# Patient Record
Sex: MALE | Race: OTHER | URBAN - NONMETROPOLITAN AREA
[De-identification: names, ages, dates, MRNs, and addresses within clinical notes are randomized per-mention and may not be internally consistent; named-entity substitution may affect disease eponyms.]

---

## 2022-01-13 ENCOUNTER — APPOINTMENT (RX ONLY)
Dept: URBAN - NONMETROPOLITAN AREA CLINIC 22 | Facility: CLINIC | Age: 62
Setting detail: DERMATOLOGY
End: 2022-01-13

## 2022-01-13 DIAGNOSIS — D485 NEOPLASM OF UNCERTAIN BEHAVIOR OF SKIN: ICD-10-CM

## 2022-01-13 PROBLEM — D48.5 NEOPLASM OF UNCERTAIN BEHAVIOR OF SKIN: Status: ACTIVE | Noted: 2022-01-13

## 2022-01-13 PROCEDURE — 11102 TANGNTL BX SKIN SINGLE LES: CPT

## 2022-01-13 PROCEDURE — ? BIOPSY BY SHAVE METHOD

## 2022-01-13 PROCEDURE — ? ADDITIONAL NOTES

## 2022-01-13 ASSESSMENT — LOCATION ZONE DERM: LOCATION ZONE: EYELID

## 2022-01-13 ASSESSMENT — LOCATION SIMPLE DESCRIPTION DERM: LOCATION SIMPLE: LEFT EYELID

## 2022-01-13 ASSESSMENT — LOCATION DETAILED DESCRIPTION DERM: LOCATION DETAILED: LEFT MEDIAL CANTHUS

## 2022-01-13 NOTE — PROCEDURE: MIPS QUALITY
Quality 130: Documentation Of Current Medications In The Medical Record: Current Medications Documented
Additional Notes: The E/M service provided during this visit was medically necessary, significant, and independent from the procedure(s) provided on the same date of service and included documented elements above and beyond the usual pre-, intra-, and post-operative work associated with the procedure(s).
Quality 431: Preventive Care And Screening: Unhealthy Alcohol Use - Screening: Patient screened for unhealthy alcohol use using a single question and scores less than 2 times per year
Quality 226: Preventive Care And Screening: Tobacco Use: Screening And Cessation Intervention: Patient screened for tobacco use and is an ex/non-smoker
Detail Level: Detailed

## 2022-04-05 ENCOUNTER — APPOINTMENT (RX ONLY)
Dept: URBAN - NONMETROPOLITAN AREA CLINIC 22 | Facility: CLINIC | Age: 62
Setting detail: DERMATOLOGY
End: 2022-04-05

## 2022-04-05 PROBLEM — C44.1192 BASAL CELL CARCINOMA OF SKIN OF LEFT LOWER EYELID, INCLUDING CANTHUS: Status: ACTIVE | Noted: 2022-04-05

## 2022-04-05 PROCEDURE — ? MOHS SURGERY

## 2022-04-05 PROCEDURE — 17312 MOHS ADDL STAGE: CPT

## 2022-04-05 PROCEDURE — 17311 MOHS 1 STAGE H/N/HF/G: CPT

## 2022-04-05 NOTE — PROCEDURE: MOHS SURGERY
-- DO NOT REPLY / DO NOT REPLY ALL --  -- Message is from the [facility]--    General Patient Message      Reason for Call: PATIENT IS CALLING TO ASK THE DOCTOR TO SEND IN A ZPACK AND COUGH SYRUP TO THE [facility] ON FILE TO HELP HER SYMPTOMS OF COUGH, CONGESTION, RUNNY NOSE, AND YELLOW PHLEGM. PLEASE CALL BACK ASAP TO CONFIRM WHETHER OR NOT IT IS BEING SENT IT.    Caller Information       Type Contact Phone    12/27/2021 01:02 PM CST Phone (Incoming) Michelle An (Self) [phone] (M)          Alternative phone number: NONE        Did the caller agree that this message can wait until the office reopens on Monday (or after the holiday)? YES - The Message Can Wait      Send a message to the provider's clinical support pool.     Turnaround time given to caller:   \"This message will be sent to [state Provider's name]. The clinical team will fulfill your request as soon as they review your message when the office opens on Monday (or after the holiday).\"       Medical Necessity Statement: Based on my medical judgement, Mohs surgery is the most appropriate treatment for this cancer compared to other treatments.

## 2022-07-07 ENCOUNTER — APPOINTMENT (RX ONLY)
Dept: URBAN - NONMETROPOLITAN AREA CLINIC 19 | Facility: CLINIC | Age: 62
Setting detail: DERMATOLOGY
End: 2022-07-07

## 2022-07-07 DIAGNOSIS — L50.1 IDIOPATHIC URTICARIA: ICD-10-CM | Status: IMPROVED

## 2022-07-07 PROCEDURE — ? COUNSELING

## 2022-07-07 PROCEDURE — 99213 OFFICE O/P EST LOW 20 MIN: CPT

## 2022-07-07 ASSESSMENT — LOCATION SIMPLE DESCRIPTION DERM: LOCATION SIMPLE: RIGHT UPPER BACK

## 2022-07-07 ASSESSMENT — LOCATION DETAILED DESCRIPTION DERM: LOCATION DETAILED: RIGHT SUPERIOR UPPER BACK

## 2022-07-07 ASSESSMENT — LOCATION ZONE DERM: LOCATION ZONE: TRUNK
